# Patient Record
Sex: FEMALE | Race: WHITE | NOT HISPANIC OR LATINO | URBAN - METROPOLITAN AREA
[De-identification: names, ages, dates, MRNs, and addresses within clinical notes are randomized per-mention and may not be internally consistent; named-entity substitution may affect disease eponyms.]

---
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
## 2024-05-15 ENCOUNTER — APPOINTMENT (OUTPATIENT)
Dept: PRIMARY CARE | Facility: CLINIC | Age: 19
End: 2024-05-15

## 2024-05-23 ENCOUNTER — OFFICE VISIT (OUTPATIENT)
Dept: PRIMARY CARE | Facility: CLINIC | Age: 19
End: 2024-05-23
Payer: COMMERCIAL

## 2024-05-23 VITALS
DIASTOLIC BLOOD PRESSURE: 80 MMHG | HEIGHT: 64 IN | OXYGEN SATURATION: 98 % | HEART RATE: 118 BPM | WEIGHT: 170 LBS | BODY MASS INDEX: 29.02 KG/M2 | TEMPERATURE: 96.5 F | SYSTOLIC BLOOD PRESSURE: 118 MMHG

## 2024-05-23 DIAGNOSIS — G43.809 OTHER MIGRAINE WITHOUT STATUS MIGRAINOSUS, NOT INTRACTABLE: Primary | ICD-10-CM

## 2024-05-23 DIAGNOSIS — Z00.00 HEALTHCARE MAINTENANCE: ICD-10-CM

## 2024-05-23 PROBLEM — N94.6 PAINFUL MENSTRUATION: Status: ACTIVE | Noted: 2024-05-23

## 2024-05-23 PROBLEM — R10.13 ABDOMINAL PAIN, EPIGASTRIC: Status: ACTIVE | Noted: 2024-05-23

## 2024-05-23 PROBLEM — K21.9 ESOPHAGEAL REFLUX: Status: ACTIVE | Noted: 2024-05-23

## 2024-05-23 PROBLEM — M25.512 LEFT SHOULDER PAIN: Status: ACTIVE | Noted: 2024-05-23

## 2024-05-23 PROBLEM — R07.9 CHEST PAIN: Status: ACTIVE | Noted: 2024-05-23

## 2024-05-23 PROBLEM — F31.81 BIPOLAR 2 DISORDER (MULTI): Status: ACTIVE | Noted: 2024-05-23

## 2024-05-23 PROBLEM — F90.0 ATTENTION DEFICIT HYPERACTIVITY DISORDER, PREDOMINANTLY INATTENTIVE TYPE: Status: ACTIVE | Noted: 2024-05-23

## 2024-05-23 PROBLEM — F39 MOOD DISORDER (CMS-HCC): Status: ACTIVE | Noted: 2024-05-23

## 2024-05-23 PROBLEM — R11.10 VOMITING: Status: ACTIVE | Noted: 2024-05-23

## 2024-05-23 PROBLEM — M25.50 POLYARTHRALGIA: Status: ACTIVE | Noted: 2024-05-23

## 2024-05-23 PROBLEM — K59.00 CONSTIPATION: Status: ACTIVE | Noted: 2024-05-23

## 2024-05-23 PROBLEM — M24.80 HYPERMOBILE JOINT SYNDROME OF MULTIPLE SITES: Status: ACTIVE | Noted: 2024-05-23

## 2024-05-23 PROBLEM — R79.89 LOW VITAMIN D LEVEL: Status: ACTIVE | Noted: 2024-05-23

## 2024-05-23 PROBLEM — I73.00 RAYNAUD'S PHENOMENON: Status: ACTIVE | Noted: 2024-05-23

## 2024-05-23 PROCEDURE — 99385 PREV VISIT NEW AGE 18-39: CPT

## 2024-05-23 PROCEDURE — 1036F TOBACCO NON-USER: CPT

## 2024-05-23 RX ORDER — SUMATRIPTAN 50 MG/1
50 TABLET, FILM COATED ORAL ONCE AS NEEDED
Qty: 27 TABLET | Refills: 0 | Status: SHIPPED | OUTPATIENT
Start: 2024-05-23 | End: 2024-06-22

## 2024-05-23 ASSESSMENT — PATIENT HEALTH QUESTIONNAIRE - PHQ9
1. LITTLE INTEREST OR PLEASURE IN DOING THINGS: NOT AT ALL
SUM OF ALL RESPONSES TO PHQ9 QUESTIONS 1 & 2: 0
2. FEELING DOWN, DEPRESSED OR HOPELESS: NOT AT ALL

## 2024-05-23 ASSESSMENT — ENCOUNTER SYMPTOMS
LOSS OF SENSATION IN FEET: 0
DEPRESSION: 0
OCCASIONAL FEELINGS OF UNSTEADINESS: 0

## 2024-05-23 NOTE — PROGRESS NOTES
Subjective   Patient ID: Ni Ramsey is a 19 y.o. female who presents for Annual Exam (Ni is here for her annual exam. ).  Work physical today  She works as Direct care professional    Migraine  --dark room helps at times  --at least 2-3 times per week  --tried NSAIDs to no avail  --fam history in mother.     --pain is bilateral  --can be in the neck as well.    --nonsmoker, no alcohol use.  --tries to exercise.        Vitals:    05/23/24 0948   BP: 118/80   Pulse: (!) 118   Temp: 35.8 °C (96.5 °F)   SpO2: 98%       Review of Systems    Objective   Physical Exam    Assessment/Plan   Problem List Items Addressed This Visit    None  Visit Diagnoses       Other migraine without status migrainosus, not intractable    -  Primary    Relevant Medications    SUMAtriptan (Imitrex) 50 mg tablet    Healthcare maintenance                     Thank you for coming in today, please call my office if you have any concerns or questions.     Shai BERMUDEZ, CNP

## 2024-05-23 NOTE — PATIENT INSTRUCTIONS
Sumatriptan trial on as needed basis    Thank you for coming in today, if any questions or concerns arise, please call my office.   AIDAN John-CNP

## 2024-06-10 ENCOUNTER — OFFICE VISIT (OUTPATIENT)
Dept: PRIMARY CARE | Facility: CLINIC | Age: 19
End: 2024-06-10
Payer: COMMERCIAL

## 2024-06-10 VITALS
DIASTOLIC BLOOD PRESSURE: 78 MMHG | HEART RATE: 106 BPM | OXYGEN SATURATION: 98 % | SYSTOLIC BLOOD PRESSURE: 120 MMHG | TEMPERATURE: 96.4 F | WEIGHT: 194 LBS | BODY MASS INDEX: 33.83 KG/M2

## 2024-06-10 DIAGNOSIS — E16.2 HYPOGLYCEMIA: Primary | ICD-10-CM

## 2024-06-10 DIAGNOSIS — Z00.00 HEALTHCARE MAINTENANCE: ICD-10-CM

## 2024-06-10 DIAGNOSIS — R35.0 URINARY FREQUENCY: ICD-10-CM

## 2024-06-10 DIAGNOSIS — R11.2 NAUSEA AND VOMITING, UNSPECIFIED VOMITING TYPE: ICD-10-CM

## 2024-06-10 LAB
ALBUMIN SERPL BCP-MCNC: 4.2 G/DL (ref 3.4–5)
ALP SERPL-CCNC: 85 U/L (ref 33–110)
ALT SERPL W P-5'-P-CCNC: 15 U/L (ref 7–45)
ANION GAP SERPL CALC-SCNC: 14 MMOL/L (ref 10–20)
AST SERPL W P-5'-P-CCNC: 18 U/L (ref 9–39)
BASOPHILS # BLD AUTO: 0.07 X10*3/UL (ref 0–0.1)
BASOPHILS NFR BLD AUTO: 1.1 %
BILIRUB SERPL-MCNC: 0.5 MG/DL (ref 0–1.2)
BUN SERPL-MCNC: 9 MG/DL (ref 6–23)
CALCIUM SERPL-MCNC: 9.5 MG/DL (ref 8.6–10.3)
CHLORIDE SERPL-SCNC: 102 MMOL/L (ref 98–107)
CHOLEST SERPL-MCNC: 125 MG/DL (ref 0–199)
CHOLESTEROL/HDL RATIO: 2.2
CO2 SERPL-SCNC: 27 MMOL/L (ref 21–32)
CREAT SERPL-MCNC: 0.72 MG/DL (ref 0.5–1.05)
EGFRCR SERPLBLD CKD-EPI 2021: >90 ML/MIN/1.73M*2
EOSINOPHIL # BLD AUTO: 0.14 X10*3/UL (ref 0–0.7)
EOSINOPHIL NFR BLD AUTO: 2.2 %
ERYTHROCYTE [DISTWIDTH] IN BLOOD BY AUTOMATED COUNT: 12.5 % (ref 11.5–14.5)
GLUCOSE SERPL-MCNC: 77 MG/DL (ref 74–99)
HCT VFR BLD AUTO: 44.3 % (ref 36–46)
HDLC SERPL-MCNC: 58.1 MG/DL
HGB BLD-MCNC: 14.1 G/DL (ref 12–16)
IMM GRANULOCYTES # BLD AUTO: 0.01 X10*3/UL (ref 0–0.7)
IMM GRANULOCYTES NFR BLD AUTO: 0.2 % (ref 0–0.9)
LDLC SERPL CALC-MCNC: 58 MG/DL
LYMPHOCYTES # BLD AUTO: 1.5 X10*3/UL (ref 1.2–4.8)
LYMPHOCYTES NFR BLD AUTO: 24 %
MCH RBC QN AUTO: 29.3 PG (ref 26–34)
MCHC RBC AUTO-ENTMCNC: 31.8 G/DL (ref 32–36)
MCV RBC AUTO: 92 FL (ref 80–100)
MONOCYTES # BLD AUTO: 0.49 X10*3/UL (ref 0.1–1)
MONOCYTES NFR BLD AUTO: 7.8 %
NEUTROPHILS # BLD AUTO: 4.04 X10*3/UL (ref 1.2–7.7)
NEUTROPHILS NFR BLD AUTO: 64.7 %
NON HDL CHOLESTEROL: 67 MG/DL (ref 0–119)
NRBC BLD-RTO: 0 /100 WBCS (ref 0–0)
PLATELET # BLD AUTO: 165 X10*3/UL (ref 150–450)
POC ALBUMIN /CREATININE RATIO MANUALLY ENTERED: <30 UG/MG CREAT
POC APPEARANCE, URINE: ABNORMAL
POC BILIRUBIN, URINE: NEGATIVE
POC BLOOD, URINE: NEGATIVE
POC COLOR, URINE: YELLOW
POC GLUCOSE, URINE: NEGATIVE MG/DL
POC KETONES, URINE: NEGATIVE MG/DL
POC LEUKOCYTES, URINE: NEGATIVE
POC NITRITE,URINE: NEGATIVE
POC PH, URINE: 6 PH
POC PROTEIN, URINE: NEGATIVE MG/DL
POC SPECIFIC GRAVITY, URINE: >=1.03
POC URINE ALBUMIN: 30 MG/L
POC URINE CREATININE: 300 MG/DL
POC UROBILINOGEN, URINE: 0.2 EU/DL
POTASSIUM SERPL-SCNC: 3.9 MMOL/L (ref 3.5–5.3)
PREGNANCY TEST URINE, POC: NEGATIVE
PROT SERPL-MCNC: 6.9 G/DL (ref 6.4–8.2)
RBC # BLD AUTO: 4.82 X10*6/UL (ref 4–5.2)
SODIUM SERPL-SCNC: 139 MMOL/L (ref 136–145)
TRIGL SERPL-MCNC: 44 MG/DL (ref 0–149)
TSH SERPL-ACNC: 2.81 MIU/L (ref 0.44–3.98)
VLDL: 9 MG/DL (ref 0–40)
WBC # BLD AUTO: 6.3 X10*3/UL (ref 4.4–11.3)

## 2024-06-10 PROCEDURE — 81003 URINALYSIS AUTO W/O SCOPE: CPT

## 2024-06-10 PROCEDURE — 82306 VITAMIN D 25 HYDROXY: CPT

## 2024-06-10 PROCEDURE — 36415 COLL VENOUS BLD VENIPUNCTURE: CPT

## 2024-06-10 PROCEDURE — 83525 ASSAY OF INSULIN: CPT

## 2024-06-10 PROCEDURE — 83036 HEMOGLOBIN GLYCOSYLATED A1C: CPT

## 2024-06-10 PROCEDURE — 82044 UR ALBUMIN SEMIQUANTITATIVE: CPT

## 2024-06-10 PROCEDURE — 85025 COMPLETE CBC W/AUTO DIFF WBC: CPT

## 2024-06-10 PROCEDURE — 81025 URINE PREGNANCY TEST: CPT

## 2024-06-10 PROCEDURE — 84443 ASSAY THYROID STIM HORMONE: CPT

## 2024-06-10 PROCEDURE — 80061 LIPID PANEL: CPT

## 2024-06-10 PROCEDURE — 99214 OFFICE O/P EST MOD 30 MIN: CPT

## 2024-06-10 PROCEDURE — 80053 COMPREHEN METABOLIC PANEL: CPT

## 2024-06-10 NOTE — PROGRESS NOTES
Subjective   Patient ID: Ni Ramsey is a 19 y.o. female who presents for Follow-up (Ni is here for low blood sugar readings. During work it drops into the low 50s. Has been urinating a lot more lately. Has been getting dizzy and blurred vision. Nurse at work tested blood sugars and they only got as high as 50s. Is eating 3 meals a day. ).  Weight loss, increased urination, dizziness  Glucose at work has been 50s-60s, feels better within a few minutes  Feels like she is going to pass out.    Glucose has been low between meals.   Does have family history of low blood sugar, grandmother and grandfather on maternal side.     Works second shift, sleeps at 0200, until 0500, then back to sleep from 0600 to 1000.   Does not snore  No history of sleep apnea.     She appears today in her usual state of health          Vitals:    06/10/24 1046   BP: 120/78   Pulse: 106   Temp: 35.8 °C (96.4 °F)   SpO2: 98%       Review of Systems    Objective   Physical Exam    Assessment/Plan   Problem List Items Addressed This Visit       Vomiting    Relevant Orders    Vitamin D 25-Hydroxy,Total (for eval of Vitamin D levels)    CBC and Auto Differential    Comprehensive Metabolic Panel    TSH with reflex to Free T4 if abnormal    POCT Pregnancy, Urine manually resulted (Completed)    Insulin, random     Other Visit Diagnoses       Hypoglycemia    -  Primary    Relevant Orders    Comprehensive Metabolic Panel    TSH with reflex to Free T4 if abnormal    Insulin, random    Healthcare maintenance        Relevant Orders    Lipid Panel    Urinary frequency        Relevant Orders    CBC and Auto Differential    Comprehensive Metabolic Panel    POCT UA Automated manually resulted (Completed)    POCT Albumin random urine manually resulted (Completed)    Hemoglobin A1C    Insulin, random                 Thank you for coming in today, please call my office if you have any concerns or questions.     Shai BERMUDEZ, CNP

## 2024-06-11 LAB
25(OH)D3 SERPL-MCNC: 21 NG/ML (ref 30–100)
EST. AVERAGE GLUCOSE BLD GHB EST-MCNC: 94 MG/DL
HBA1C MFR BLD: 4.9 %
INSULIN SERPL-ACNC: 19 UIU/ML (ref 3–25)

## 2024-06-11 NOTE — RESULT ENCOUNTER NOTE
Results are normal, please notify the patient. Normal with exception of vitamin d being on the low side, recommend 2k units daily of otc supplement. Insulin level Is normal. Continue small frequent meals, do not skip meals, and return if no improvement.

## 2024-06-12 ENCOUNTER — TELEPHONE (OUTPATIENT)
Dept: PRIMARY CARE | Facility: CLINIC | Age: 19
End: 2024-06-12
Payer: COMMERCIAL

## 2024-06-12 NOTE — TELEPHONE ENCOUNTER
----- Message from MILKA John sent at 6/11/2024  1:13 PM EDT -----  Results are normal, please notify the patient. Normal with exception of vitamin d being on the low side, recommend 2k units daily of otc supplement. Insulin level Is normal. Continue small frequent meals, do not skip meals, and return if no improvement.

## 2025-02-01 PROCEDURE — 99283 EMERGENCY DEPT VISIT LOW MDM: CPT | Performed by: EMERGENCY MEDICINE

## 2025-02-02 ENCOUNTER — HOSPITAL ENCOUNTER (EMERGENCY)
Facility: HOSPITAL | Age: 20
Discharge: HOME | End: 2025-02-02
Attending: EMERGENCY MEDICINE
Payer: COMMERCIAL

## 2025-02-02 VITALS
SYSTOLIC BLOOD PRESSURE: 126 MMHG | HEART RATE: 95 BPM | TEMPERATURE: 98.7 F | HEIGHT: 63 IN | RESPIRATION RATE: 18 BRPM | BODY MASS INDEX: 34.55 KG/M2 | OXYGEN SATURATION: 99 % | DIASTOLIC BLOOD PRESSURE: 99 MMHG | WEIGHT: 195 LBS

## 2025-02-02 DIAGNOSIS — E87.6 HYPOKALEMIA: ICD-10-CM

## 2025-02-02 DIAGNOSIS — F41.0 PANIC ATTACK: ICD-10-CM

## 2025-02-02 DIAGNOSIS — F48.8 PSYCHOGENIC SYNCOPE: Primary | ICD-10-CM

## 2025-02-02 DIAGNOSIS — F41.9 ANXIETY: ICD-10-CM

## 2025-02-02 LAB
ALBUMIN SERPL BCP-MCNC: 4.3 G/DL (ref 3.4–5)
ALP SERPL-CCNC: 97 U/L (ref 33–110)
ALT SERPL W P-5'-P-CCNC: 15 U/L (ref 7–45)
ANION GAP SERPL CALC-SCNC: 11 MMOL/L (ref 10–20)
APPEARANCE UR: CLEAR
AST SERPL W P-5'-P-CCNC: 18 U/L (ref 9–39)
BASOPHILS # BLD AUTO: 0.06 X10*3/UL (ref 0–0.1)
BASOPHILS NFR BLD AUTO: 0.6 %
BILIRUB SERPL-MCNC: 0.2 MG/DL (ref 0–1.2)
BILIRUB UR STRIP.AUTO-MCNC: NEGATIVE MG/DL
BUN SERPL-MCNC: 10 MG/DL (ref 6–23)
CALCIUM SERPL-MCNC: 9.8 MG/DL (ref 8.6–10.3)
CHLORIDE SERPL-SCNC: 103 MMOL/L (ref 98–107)
CO2 SERPL-SCNC: 26 MMOL/L (ref 21–32)
COLOR UR: COLORLESS
CREAT SERPL-MCNC: 0.74 MG/DL (ref 0.5–1.05)
EGFRCR SERPLBLD CKD-EPI 2021: >90 ML/MIN/1.73M*2
EOSINOPHIL # BLD AUTO: 0.13 X10*3/UL (ref 0–0.7)
EOSINOPHIL NFR BLD AUTO: 1.2 %
ERYTHROCYTE [DISTWIDTH] IN BLOOD BY AUTOMATED COUNT: 12.6 % (ref 11.5–14.5)
GLUCOSE SERPL-MCNC: 111 MG/DL (ref 74–99)
GLUCOSE UR STRIP.AUTO-MCNC: NORMAL MG/DL
HCG UR QL IA.RAPID: NEGATIVE
HCT VFR BLD AUTO: 40.6 % (ref 36–46)
HGB BLD-MCNC: 13.7 G/DL (ref 12–16)
IMM GRANULOCYTES # BLD AUTO: 0.03 X10*3/UL (ref 0–0.7)
IMM GRANULOCYTES NFR BLD AUTO: 0.3 % (ref 0–0.9)
KETONES UR STRIP.AUTO-MCNC: NEGATIVE MG/DL
LEUKOCYTE ESTERASE UR QL STRIP.AUTO: NEGATIVE
LYMPHOCYTES # BLD AUTO: 2.56 X10*3/UL (ref 1.2–4.8)
LYMPHOCYTES NFR BLD AUTO: 23.6 %
MCH RBC QN AUTO: 29.5 PG (ref 26–34)
MCHC RBC AUTO-ENTMCNC: 33.7 G/DL (ref 32–36)
MCV RBC AUTO: 87 FL (ref 80–100)
MONOCYTES # BLD AUTO: 0.9 X10*3/UL (ref 0.1–1)
MONOCYTES NFR BLD AUTO: 8.3 %
NEUTROPHILS # BLD AUTO: 7.18 X10*3/UL (ref 1.2–7.7)
NEUTROPHILS NFR BLD AUTO: 66 %
NITRITE UR QL STRIP.AUTO: NEGATIVE
NRBC BLD-RTO: 0 /100 WBCS (ref 0–0)
PH UR STRIP.AUTO: 6.5 [PH]
PLATELET # BLD AUTO: 206 X10*3/UL (ref 150–450)
POTASSIUM SERPL-SCNC: 3.4 MMOL/L (ref 3.5–5.3)
PROT SERPL-MCNC: 7.4 G/DL (ref 6.4–8.2)
PROT UR STRIP.AUTO-MCNC: NEGATIVE MG/DL
RBC # BLD AUTO: 4.65 X10*6/UL (ref 4–5.2)
RBC # UR STRIP.AUTO: NEGATIVE /UL
SODIUM SERPL-SCNC: 137 MMOL/L (ref 136–145)
SP GR UR STRIP.AUTO: 1.01
UROBILINOGEN UR STRIP.AUTO-MCNC: NORMAL MG/DL
WBC # BLD AUTO: 10.9 X10*3/UL (ref 4.4–11.3)

## 2025-02-02 PROCEDURE — 81025 URINE PREGNANCY TEST: CPT | Performed by: EMERGENCY MEDICINE

## 2025-02-02 PROCEDURE — 36415 COLL VENOUS BLD VENIPUNCTURE: CPT | Performed by: EMERGENCY MEDICINE

## 2025-02-02 PROCEDURE — 81003 URINALYSIS AUTO W/O SCOPE: CPT | Performed by: EMERGENCY MEDICINE

## 2025-02-02 PROCEDURE — 2500000002 HC RX 250 W HCPCS SELF ADMINISTERED DRUGS (ALT 637 FOR MEDICARE OP, ALT 636 FOR OP/ED): Performed by: EMERGENCY MEDICINE

## 2025-02-02 PROCEDURE — 85025 COMPLETE CBC W/AUTO DIFF WBC: CPT | Performed by: EMERGENCY MEDICINE

## 2025-02-02 PROCEDURE — 80053 COMPREHEN METABOLIC PANEL: CPT | Performed by: EMERGENCY MEDICINE

## 2025-02-02 RX ORDER — LORAZEPAM 0.5 MG/1
0.5 TABLET ORAL EVERY 8 HOURS PRN
Qty: 20 TABLET | Refills: 0 | Status: SHIPPED | OUTPATIENT
Start: 2025-02-02 | End: 2025-02-09

## 2025-02-02 RX ORDER — LAMOTRIGINE 25 MG/1
25 TABLET ORAL DAILY
COMMUNITY

## 2025-02-02 RX ORDER — POTASSIUM CHLORIDE 20 MEQ/1
40 TABLET, EXTENDED RELEASE ORAL ONCE
Status: COMPLETED | OUTPATIENT
Start: 2025-02-02 | End: 2025-02-02

## 2025-02-02 RX ORDER — POTASSIUM CHLORIDE 750 MG/1
10 TABLET, FILM COATED, EXTENDED RELEASE ORAL 2 TIMES DAILY
Qty: 20 TABLET | Refills: 0 | Status: SHIPPED | OUTPATIENT
Start: 2025-02-02 | End: 2025-02-12

## 2025-02-02 RX ADMIN — POTASSIUM CHLORIDE 40 MEQ: 1500 TABLET, EXTENDED RELEASE ORAL at 01:07

## 2025-02-02 ASSESSMENT — PAIN - FUNCTIONAL ASSESSMENT: PAIN_FUNCTIONAL_ASSESSMENT: 0-10

## 2025-02-02 ASSESSMENT — PAIN SCALES - GENERAL: PAINLEVEL_OUTOF10: 0 - NO PAIN

## 2025-02-02 ASSESSMENT — COLUMBIA-SUICIDE SEVERITY RATING SCALE - C-SSRS
2. HAVE YOU ACTUALLY HAD ANY THOUGHTS OF KILLING YOURSELF?: NO
6. HAVE YOU EVER DONE ANYTHING, STARTED TO DO ANYTHING, OR PREPARED TO DO ANYTHING TO END YOUR LIFE?: NO
1. IN THE PAST MONTH, HAVE YOU WISHED YOU WERE DEAD OR WISHED YOU COULD GO TO SLEEP AND NOT WAKE UP?: NO

## 2025-02-02 NOTE — ED PROVIDER NOTES
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  Tonight she was taking one of the gentleman downstairs and she felt her chest tightening and became very panicky she has done in the past and \"passed out\" for, she says, about a minute.  She says that she hurt her shoulder and wrist but she can move them freely though not tender on range of motion to me.  Did not hit her head or injure her neck.  She says that she has a long history of anxiety issues and her doctors are going to do genetic testing to see what drug would be the best for her.            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    Dennis-Danlos syndrome    Other disorder of circulatory system     Ischemia of finger    Other specified health status 10/27/2015    No pertinent past medical history    Other specified health status 10/27/2015    No pertinent past surgical history    Personal history of other diseases of the musculoskeletal system and connective tissue     History of fibromyalgia    Personal history of other mental and behavioral disorders     History of attention deficit hyperactivity disorder (ADHD)    Personal history of other mental and behavioral disorders     History of bipolar disorder    Unspecified asthma, uncomplicated ([facility])     Mild asthma, unspecified whether complicated, unspecified whether persistent     No past surgical history on file.  No family history on file.  Social History     Tobacco Use    Smoking status: Never    Smokeless tobacco: Never   Vaping Use    Vaping status: Never Used   Substance Use Topics    Alcohol use: Never    Drug use: Never       Physical Exam   ED Triage Vitals " [02/02/25 0008]   Temperature Heart Rate Respirations BP   37.4 °C (99.3 °F) 96 14 (!) 145/92      Pulse Ox Temp Source Heart Rate Source Patient Position   99 % Temporal Monitor Lying      BP Location FiO2 (%)     Right arm --       Physical Exam  Vitals and nursing note reviewed.   HENT:      Head: Normocephalic and atraumatic.      Right Ear: Tympanic membrane and ear canal normal.      Left Ear: Tympanic membrane and ear canal normal.      Nose: Nose normal.      Mouth/Throat:      Mouth: Mucous membranes are moist.   Cardiovascular:      Rate and Rhythm: Normal rate.   Pulmonary:      Effort: Pulmonary effort is normal.      Breath sounds: Normal breath sounds.   Abdominal:      General: Abdomen is flat.      Palpations: Abdomen is soft.   Musculoskeletal:         General: Normal range of motion.      Cervical back: Normal range of motion.   Skin:     General: Skin is warm and dry.   Neurological:      General: No focal deficit present.      Mental Status: She is alert.           ED Course & MDM                  No data recorded     [place] Coma Scale Score: 15 (02/02/25 0016 : Dorene Lozano RN)                           Medical Decision Making  EKG interpreted by me: Sinus tachycardia with a rate of 107.  Intervals and axes are normal.        Procedure  Procedures     Arash Maguire MD  02/02/25 0011       Arash Maguire MD  02/02/25 0018

## 2025-02-05 ENCOUNTER — HOSPITAL ENCOUNTER (OUTPATIENT)
Dept: CARDIOLOGY | Facility: HOSPITAL | Age: 20
Discharge: HOME | End: 2025-02-05
Payer: COMMERCIAL

## 2025-02-05 LAB
ATRIAL RATE: 107 BPM
P AXIS: 37 DEGREES
P OFFSET: 181 MS
P ONSET: 136 MS
PR INTERVAL: 162 MS
Q ONSET: 217 MS
QRS COUNT: 18 BEATS
QRS DURATION: 76 MS
QT INTERVAL: 328 MS
QTC CALCULATION(BAZETT): 437 MS
QTC FREDERICIA: 397 MS
R AXIS: 66 DEGREES
T AXIS: 65 DEGREES
T OFFSET: 381 MS
VENTRICULAR RATE: 107 BPM

## 2025-02-05 PROCEDURE — 93005 ELECTROCARDIOGRAM TRACING: CPT

## 2025-05-01 ENCOUNTER — APPOINTMENT (OUTPATIENT)
Dept: PRIMARY CARE | Facility: CLINIC | Age: 20
End: 2025-05-01
Payer: COMMERCIAL

## 2025-05-01 VITALS
BODY MASS INDEX: 37.15 KG/M2 | WEIGHT: 209.7 LBS | TEMPERATURE: 97.1 F | DIASTOLIC BLOOD PRESSURE: 86 MMHG | SYSTOLIC BLOOD PRESSURE: 112 MMHG | OXYGEN SATURATION: 99 % | HEART RATE: 115 BPM

## 2025-05-01 DIAGNOSIS — G89.29 CHRONIC BILATERAL LOW BACK PAIN WITH BILATERAL SCIATICA: Primary | ICD-10-CM

## 2025-05-01 DIAGNOSIS — M54.41 CHRONIC BILATERAL LOW BACK PAIN WITH BILATERAL SCIATICA: Primary | ICD-10-CM

## 2025-05-01 DIAGNOSIS — M54.42 CHRONIC BILATERAL LOW BACK PAIN WITH BILATERAL SCIATICA: Primary | ICD-10-CM

## 2025-05-01 PROCEDURE — 1036F TOBACCO NON-USER: CPT

## 2025-05-01 PROCEDURE — 99214 OFFICE O/P EST MOD 30 MIN: CPT

## 2025-05-01 RX ORDER — NAPROXEN 500 MG/1
500 TABLET ORAL 2 TIMES DAILY PRN
Qty: 60 TABLET | Refills: 0 | Status: SHIPPED | OUTPATIENT
Start: 2025-05-01 | End: 2025-07-30

## 2025-05-01 ASSESSMENT — PATIENT HEALTH QUESTIONNAIRE - PHQ9
SUM OF ALL RESPONSES TO PHQ9 QUESTIONS 1 AND 2: 0
1. LITTLE INTEREST OR PLEASURE IN DOING THINGS: NOT AT ALL
2. FEELING DOWN, DEPRESSED OR HOPELESS: NOT AT ALL

## 2025-05-01 NOTE — PATIENT INSTRUCTIONS
Ortho referral spine  MRI lumbar spine ordered    Thank you for coming in today, if any questions or concerns arise, please call my office.   AIDAN John-CNP

## 2025-05-01 NOTE — PROGRESS NOTES
Subjective   Patient ID: Ni Ramsey is a 20 y.o. female who presents for Back Pain (Reports having back problems her whole life. She has tried therapy multiple times with no relief. Sitting, laying down, standing she has aching pressure in the middle of her back with pain radiating down into her legs then will have whole body aches. She was told a few years ago that she has a curvature of her spine but now further workup was done. ).  HPI    Ni presents for ongoing chronic back back pain  Ongoing for many years, she saw pediatrician who ordered XR see below  Scoliosis exam negative    SLR today is positive, bilaterally worse left than right  Also tenderness on palpation of lumbar spine  Lase que sign positive.     She has underwent multiple rounds of PT without relief  Tried ibuprofen without significant relief      Works at nursing home.     History of falling off horses at times and muscle pulls  Last fell a year ago.        CT findings 5/20/21:  SPINAL CANAL: No critical spinal canal stenosis.     XR spine 2019:  RESULT: Indications: M 41.9. Scoliosis.     TECHNIQUE: Standing AP and lateral views of the thoracic and lumbar spine     FINDINGS: 12 rib-bearing thoracic-type vertebral bodies and 5 nonrib-bearing  lumbar-type vertebral bodies. There is no scoliosis. Vertebral body heights  and disc space heights are maintained.     IMPRESSION: Normal scoliosis study.     This report has been produced using speech recognition.     Original Interpreting Physician:   SERGIO READ MD  Original Transcribed by/Date: PSCB   May 10 2019 11:31A  Original Electronically Signed by/Date: SERGIO READ MD May 10 2019 11:31A       Vitals:    05/01/25 0939   BP: 112/86   Pulse: (!) 115   Temp: 36.2 °C (97.1 °F)   SpO2: 99%       Review of Systems    Objective   Physical Exam  Vitals and nursing note reviewed.   Musculoskeletal:      Lumbar back: Spasms, tenderness and bony tenderness present.  Positive right straight leg raise test and positive left straight leg raise test. No scoliosis.        Back:          Assessment/Plan   Problem List Items Addressed This Visit    None  Visit Diagnoses         Chronic bilateral low back pain with bilateral sciatica    -  Primary    Relevant Medications    naproxen (Naprosyn) 500 mg tablet    Other Relevant Orders    Adult Referral to Orthopedics and Sports Medicine    MR lumbar spine wo IV contrast                 Thank you for coming in today, please call my office if you have any concerns or questions.     Shai BERMUDEZ, CNP